# Patient Record
Sex: MALE | Race: WHITE | HISPANIC OR LATINO | Employment: STUDENT | URBAN - METROPOLITAN AREA
[De-identification: names, ages, dates, MRNs, and addresses within clinical notes are randomized per-mention and may not be internally consistent; named-entity substitution may affect disease eponyms.]

---
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
## 2025-01-07 ENCOUNTER — APPOINTMENT (OUTPATIENT)
Dept: OPHTHALMOLOGY | Facility: CLINIC | Age: 20
End: 2025-01-07
Payer: COMMERCIAL

## 2025-01-07 DIAGNOSIS — G93.2 IIH (IDIOPATHIC INTRACRANIAL HYPERTENSION): ICD-10-CM

## 2025-01-07 DIAGNOSIS — G93.2 BENIGN INTRACRANIAL HYPERTENSION: Primary | ICD-10-CM

## 2025-01-07 PROCEDURE — 99215 OFFICE O/P EST HI 40 MIN: CPT | Performed by: PSYCHIATRY & NEUROLOGY

## 2025-01-07 PROCEDURE — 92133 CPTRZD OPH DX IMG PST SGM ON: CPT | Performed by: PSYCHIATRY & NEUROLOGY

## 2025-01-07 PROCEDURE — 92083 EXTENDED VISUAL FIELD XM: CPT | Performed by: PSYCHIATRY & NEUROLOGY

## 2025-01-07 RX ORDER — ACETAZOLAMIDE 250 MG/1
250 TABLET ORAL 2 TIMES DAILY
Qty: 60 TABLET | Refills: 5 | Status: SHIPPED | OUTPATIENT
Start: 2025-01-07 | End: 2025-07-06

## 2025-01-07 ASSESSMENT — CONF VISUAL FIELD
OS_INFERIOR_TEMPORAL_RESTRICTION: 0
OD_SUPERIOR_TEMPORAL_RESTRICTION: 0
OS_NORMAL: 1
OS_SUPERIOR_NASAL_RESTRICTION: 0
OD_NORMAL: 1
OS_SUPERIOR_TEMPORAL_RESTRICTION: 0
OS_INFERIOR_NASAL_RESTRICTION: 0
OD_SUPERIOR_NASAL_RESTRICTION: 0
OD_INFERIOR_TEMPORAL_RESTRICTION: 0
OD_INFERIOR_NASAL_RESTRICTION: 0

## 2025-01-07 ASSESSMENT — TONOMETRY
IOP_METHOD: GOLDMANN APPLANATION
OD_IOP_MMHG: 14
OS_IOP_MMHG: 15

## 2025-01-07 ASSESSMENT — EXTERNAL EXAM - LEFT EYE: OS_EXAM: NORMAL

## 2025-01-07 ASSESSMENT — ENCOUNTER SYMPTOMS
CONSTITUTIONAL NEGATIVE: 0
NEUROLOGICAL NEGATIVE: 0
MUSCULOSKELETAL NEGATIVE: 0
HEMATOLOGIC/LYMPHATIC NEGATIVE: 0
CARDIOVASCULAR NEGATIVE: 0
ENDOCRINE NEGATIVE: 0
ALLERGIC/IMMUNOLOGIC NEGATIVE: 0
GASTROINTESTINAL NEGATIVE: 0
RESPIRATORY NEGATIVE: 0
EYES NEGATIVE: 1
PSYCHIATRIC NEGATIVE: 0

## 2025-01-07 ASSESSMENT — VISUAL ACUITY
OD_SC: 20/25
OS_PH_SC: 20/20-2
OS_SC: 20/40
OD_PH_SC: 20/20
METHOD: SNELLEN - LINEAR

## 2025-01-07 ASSESSMENT — CUP TO DISC RATIO
OD_RATIO: 0.20
OS_RATIO: 0.20

## 2025-01-07 ASSESSMENT — SLIT LAMP EXAM - LIDS
COMMENTS: NORMAL
COMMENTS: NORMAL

## 2025-01-07 ASSESSMENT — EXTERNAL EXAM - RIGHT EYE: OD_EXAM: NORMAL

## 2025-01-07 NOTE — PROGRESS NOTES
Assessment and Plan    08/31/2022 MRI brain without contrast, which I personally reviewed, shows     06/23/2021 B12 349.  10/12/2017 Lyme abs negative.    01/07/2025 OCT RNFL  & . (Stable)  07/01/2022 OCT RNFL  & .  08/27/2020 OCT RNFL  & .  03/14/2019 OCT RNFL  & .  OCT macula OD normal foveal contour 330 & OS normal foveal contour 331.  10/12/2017 OCT RNFL  & .  07/13/2017 OCT RNFL  & .  07/26/2016 OCT RNFL  & OS failed.  03/29/2016 OCT RNFL  & .  02/25/2016 OCT RNFL  & .    01/07/2025 HVF 24-2 OD fovea 34, wnl MD -1.42 & OS fovea 36, wnl MD -1.33.    This 19 year-old man with a history of idiopathic intracranial hypertension, Arnold-Chiari syndrome, IBS, vitamin B12 deficiency, obesity, ADHD presents for evaluation of idiopathic intracranial hypertension.    He has good vision, including normal Leonardo visual field (HVF), and stable optic nerve examination and measurements. I see no evidence of idiopathic intracranial hypertension exacerbation. Childhood idiopathic intracranial hypertension, especially in male idiopathic intracranial hypertension patients, usually reaches sustained remission with age. I expect sustained remission for him. However, he feels that acetazolamide is beneficial for symptoms. We discussed continuing it with possible further weaning as tolerated.    Plan    Continue acetazolamide 250 mg daily with option of gradual weaning.    Follow up in 4-6 months with HVF & OCT. (Dilated 1/7/2025)  .

## 2025-04-07 ENCOUNTER — APPOINTMENT (OUTPATIENT)
Dept: OPHTHALMOLOGY | Facility: CLINIC | Age: 20
End: 2025-04-07
Payer: COMMERCIAL

## 2025-06-05 ENCOUNTER — TELEPHONE (OUTPATIENT)
Dept: OPHTHALMOLOGY | Facility: CLINIC | Age: 20
End: 2025-06-05
Payer: COMMERCIAL

## 2025-07-29 ENCOUNTER — APPOINTMENT (OUTPATIENT)
Dept: OPHTHALMOLOGY | Facility: CLINIC | Age: 20
End: 2025-07-29
Payer: COMMERCIAL

## 2025-11-18 ENCOUNTER — APPOINTMENT (OUTPATIENT)
Dept: OPHTHALMOLOGY | Facility: CLINIC | Age: 20
End: 2025-11-18
Payer: COMMERCIAL